# Patient Record
Sex: FEMALE | Race: WHITE | NOT HISPANIC OR LATINO | Employment: FULL TIME | URBAN - METROPOLITAN AREA
[De-identification: names, ages, dates, MRNs, and addresses within clinical notes are randomized per-mention and may not be internally consistent; named-entity substitution may affect disease eponyms.]

---

## 2023-01-16 ENCOUNTER — HOSPITAL ENCOUNTER (EMERGENCY)
Facility: CLINIC | Age: 23
Discharge: HOME | End: 2023-01-16
Attending: EMERGENCY MEDICINE | Admitting: EMERGENCY MEDICINE
Payer: COMMERCIAL

## 2023-01-16 VITALS
OXYGEN SATURATION: 100 % | HEART RATE: 97 BPM | SYSTOLIC BLOOD PRESSURE: 113 MMHG | RESPIRATION RATE: 20 BRPM | TEMPERATURE: 98.5 F | DIASTOLIC BLOOD PRESSURE: 77 MMHG

## 2023-01-16 DIAGNOSIS — R25.1 SHAKING: ICD-10-CM

## 2023-01-16 DIAGNOSIS — F41.9 ANXIETY: ICD-10-CM

## 2023-01-16 LAB
ANION GAP SERPL CALCULATED.3IONS-SCNC: 10 MMOL/L (ref 7–15)
ATRIAL RATE - MUSE: 92 BPM
BASOPHILS # BLD AUTO: 0.1 10E3/UL (ref 0–0.2)
BASOPHILS NFR BLD AUTO: 1 %
BUN SERPL-MCNC: 13.7 MG/DL (ref 6–20)
CALCIUM SERPL-MCNC: 8.9 MG/DL (ref 8.6–10)
CHLORIDE SERPL-SCNC: 105 MMOL/L (ref 98–107)
CREAT SERPL-MCNC: 0.6 MG/DL (ref 0.51–0.95)
DEPRECATED HCO3 PLAS-SCNC: 24 MMOL/L (ref 22–29)
DIASTOLIC BLOOD PRESSURE - MUSE: NORMAL MMHG
EOSINOPHIL # BLD AUTO: 0.2 10E3/UL (ref 0–0.7)
EOSINOPHIL NFR BLD AUTO: 2 %
ERYTHROCYTE [DISTWIDTH] IN BLOOD BY AUTOMATED COUNT: 12.7 % (ref 10–15)
GFR SERPL CREATININE-BSD FRML MDRD: >90 ML/MIN/1.73M2
GLUCOSE SERPL-MCNC: 112 MG/DL (ref 70–99)
HCG SERPL QL: NEGATIVE
HCT VFR BLD AUTO: 36.2 % (ref 35–47)
HGB BLD-MCNC: 12 G/DL (ref 11.7–15.7)
IMM GRANULOCYTES # BLD: 0 10E3/UL
IMM GRANULOCYTES NFR BLD: 0 %
INTERPRETATION ECG - MUSE: NORMAL
LYMPHOCYTES # BLD AUTO: 2.9 10E3/UL (ref 0.8–5.3)
LYMPHOCYTES NFR BLD AUTO: 38 %
MCH RBC QN AUTO: 32 PG (ref 26.5–33)
MCHC RBC AUTO-ENTMCNC: 33.1 G/DL (ref 31.5–36.5)
MCV RBC AUTO: 97 FL (ref 78–100)
MONOCYTES # BLD AUTO: 0.6 10E3/UL (ref 0–1.3)
MONOCYTES NFR BLD AUTO: 8 %
NEUTROPHILS # BLD AUTO: 3.8 10E3/UL (ref 1.6–8.3)
NEUTROPHILS NFR BLD AUTO: 51 %
NRBC # BLD AUTO: 0 10E3/UL
NRBC BLD AUTO-RTO: 0 /100
P AXIS - MUSE: 49 DEGREES
PLATELET # BLD AUTO: 318 10E3/UL (ref 150–450)
POTASSIUM SERPL-SCNC: 4 MMOL/L (ref 3.4–5.3)
PR INTERVAL - MUSE: 144 MS
QRS DURATION - MUSE: 72 MS
QT - MUSE: 328 MS
QTC - MUSE: 405 MS
R AXIS - MUSE: 29 DEGREES
RBC # BLD AUTO: 3.75 10E6/UL (ref 3.8–5.2)
SODIUM SERPL-SCNC: 139 MMOL/L (ref 136–145)
SYSTOLIC BLOOD PRESSURE - MUSE: NORMAL MMHG
T AXIS - MUSE: 49 DEGREES
VENTRICULAR RATE- MUSE: 92 BPM
WBC # BLD AUTO: 7.5 10E3/UL (ref 4–11)

## 2023-01-16 PROCEDURE — 93005 ELECTROCARDIOGRAM TRACING: CPT

## 2023-01-16 PROCEDURE — 80048 BASIC METABOLIC PNL TOTAL CA: CPT | Performed by: EMERGENCY MEDICINE

## 2023-01-16 PROCEDURE — 36415 COLL VENOUS BLD VENIPUNCTURE: CPT | Performed by: EMERGENCY MEDICINE

## 2023-01-16 PROCEDURE — 84703 CHORIONIC GONADOTROPIN ASSAY: CPT | Performed by: EMERGENCY MEDICINE

## 2023-01-16 PROCEDURE — 99284 EMERGENCY DEPT VISIT MOD MDM: CPT

## 2023-01-16 PROCEDURE — 85025 COMPLETE CBC W/AUTO DIFF WBC: CPT | Performed by: EMERGENCY MEDICINE

## 2023-01-16 ASSESSMENT — ACTIVITIES OF DAILY LIVING (ADL): ADLS_ACUITY_SCORE: 35

## 2023-01-16 NOTE — ED PROVIDER NOTES
History     Chief Complaint:  Shaking       HPI   Lori Bui is a 22 year old female who presents with shaking and some shortness of breath which happened tonight.  It also happened last night but she tried to just go back to bed.  Tonight's episode was enough to have her come in for evaluation.  She denies any chest pain or pleuritic chest discomfort.  She has not been sick recently with a cough cold or runny nose.  She is not currently taking any medications.  She is under any more stress than usual.  She has no physical pain.  She does still feel somewhat shaky but is starting to improve.  She does not drink alcohol or smoke cigarettes or use any recreational drugs..      Independent Historian: Yes    ROS:  Review of Systems  As in HPI    Allergies:  No Known Allergies     Medications:    No current outpatient medications on file.      Past Medical History:    No past medical history on file.    Past Surgical History:    No past surgical history on file.     Family History:    family history is not on file.    Social History:     PCP: No primary care provider on file.     Physical Exam     Patient Vitals for the past 24 hrs:   BP Temp Temp src Pulse Resp SpO2   01/16/23 0230 119/72 -- -- 95 -- 97 %   01/16/23 0215 125/72 -- -- 87 -- 100 %   01/16/23 0200 (!) 125/93 -- -- 97 -- 99 %   01/16/23 0147 138/85 98.5  F (36.9  C) Temporal 99 20 100 %        Physical Exam  Constitutional: Vital signs reviewed as above  General: Alert, pleasant  HEENT: Moist mucous membranes  Eyes: Pupils are equal, round, and reactive to light.   Neck: Normal range of motion  Cardiovascular: normal rate, Regular rhythm and normal heart sounds.  Mo MRG  Pulmonary/Chest: Effort normal and breath sounds normal. No respiratory distress. Patient has no wheezes. Patient has no rales.   Gastrointestinal: Soft. Positive bowel sounds. No MRG.  Musculoskeletal/Extremities: Full ROM.  Endo: No pitting edema  Neurological: Alert, no focal  deficits.  Skin: Skin is warm and dry.   Psychiatric: Pleasant, slightly shaky      Emergency Department Course   ECG:  ECG results from 01/16/23   EKG 12-lead, tracing only     Value    Systolic Blood Pressure     Diastolic Blood Pressure     Ventricular Rate 92    Atrial Rate 92    AK Interval 144    QRS Duration 72        QTc 405    P Axis 49    R AXIS 29    T Axis 49    Interpretation ECG      Sinus rhythm  Normal ECG  No previous ECGs available         Laboratory:  Labs Ordered and Resulted from Time of ED Arrival to Time of ED Departure   BASIC METABOLIC PANEL - Abnormal       Result Value    Sodium 139      Potassium 4.0      Chloride 105      Carbon Dioxide (CO2) 24      Anion Gap 10      Urea Nitrogen 13.7      Creatinine 0.60      Calcium 8.9      Glucose 112 (*)     GFR Estimate >90     CBC WITH PLATELETS AND DIFFERENTIAL - Abnormal    WBC Count 7.5      RBC Count 3.75 (*)     Hemoglobin 12.0      Hematocrit 36.2      MCV 97      MCH 32.0      MCHC 33.1      RDW 12.7      Platelet Count 318      % Neutrophils 51      % Lymphocytes 38      % Monocytes 8      % Eosinophils 2      % Basophils 1      % Immature Granulocytes 0      NRBCs per 100 WBC 0      Absolute Neutrophils 3.8      Absolute Lymphocytes 2.9      Absolute Monocytes 0.6      Absolute Eosinophils 0.2      Absolute Basophils 0.1      Absolute Immature Granulocytes 0.0      Absolute NRBCs 0.0     HCG QUALITATIVE PREGNANCY - Normal    hCG Serum Qualitative Negative            Emergency Department Course & Assessments:      Interventions:  Medications - No data to display      Disposition:  The patient was discharged to home.     Impression & Plan      Medical Decision Making:  Patient presents again after waking up the last 2 nights with shortness of breath and shaking.  This certainly could be anxiety.  She has not had any recent illnesses.  She  Be chest pain with this.  There is no nausea or vomiting.  She is already starting to feel  somewhat better here.  She does not use any illicit substances, alcohol or cigarettes.  She is otherwise very healthy.  Her exam here is very reassuring.  EKG shows sinus rhythm, rate of 92, normal axis and no acute concerning ST or T wave changes.  Pregnancy screen, BMP, and CBC were all unremarkable.  On reevaluation she is much more comfortable.  She will monitor her symptoms closely as this may represent anxiety.  Certainly this becomes a trend she can follow-up with her primary care doctor and discuss management options at that time.  Again no chest pain to concern me for acute coronary syndrome or PE.      Diagnosis:    ICD-10-CM    1. Anxiety  F41.9       2. Shaking  R25.1            Discharge Medications:  New Prescriptions    No medications on file      1/16/2023   Junito Mancini MD Walters, Brent Aaron, MD  01/16/23 0315

## 2023-01-16 NOTE — ED TRIAGE NOTES
"    Pt arrives to ED with \"shaking\" that woke her up tonight. Pt states this happened last night as well, but she fell back asleep. This episode has been ongoing for one hour. Denies medical hx, etoh, drugs, or pregnancy. Pt also admits to LUE tightness and tingling.   "